# Patient Record
(demographics unavailable — no encounter records)

---

## 2024-12-19 NOTE — REASON FOR VISIT
[Follow-Up] : a follow-up visit [Pulmonary Hypertension] : pulmonary hypertension [[employer] ] : provided by [employer]   [TextBox_13] : Dr. Magalys Bray

## 2024-12-19 NOTE — HISTORY OF PRESENT ILLNESS
[Former] : former [< 20 pack-years] : < 20 pack-years [TextBox_4] : 82 year old woman with Type 2 DM, HTN, COPD (former smoker 19 PY quit 1992), on 3L O2. She ambulates with a walker at home and a wheelchair outside the home. She can't walk very much. Can't quantify. She gets tired quickly. Today walking form elevator to office she got tired. She feels tired and a little bit of chest pain. She does feel dizzy but not syncope. She has palpitations with and without walking. January 2021 she started using O2. She saw a pulmonologist at [facility], sees him every 3 months. She has had PFTs in the past. In January 2021 her Hgb was really low and went for ?endoscopy. She left the hospital with oxygen. Even before 2021 she used walker outside but more due to arthritis. 1/2 PPD for 37 years, quit 1992. March 2021 had bad bronchitis and needed intubation for 4 days. She had multiple hospitalizations in 2021 for respiratory complaints and fluid retention. She has a lot of joint pains in her hands and knees. Takes acetaminophen which helps. No Raynaud's. LE edema is currently controlled. She never worked outside of the home.   She has had persistently elevated RVSP since 2021 (60 -> 90 -> 65 -> 75) and Grade II diastolic dysfunction and moderate pericardial effusion. After initial visit she underwent V/Q scan (low probability for CTEPD), RUQ US (no portal htn, but + cirrhotic liver), HIV/Hep C neg, Cr 1.59, BNP elevated to 1321, CTD panel WNL, mild nonspecific elevation in VICTOR HUGO 1:160, chronic anemia (H/H 10.1/31.1). Sleep study negative for IMTIAZ.   12-20-24 October 2023 RHC severely elevated PA pressures w/ normal wedge pressures. SBP in 180-200's. Required hydralazine, amlodipine during RHC procedure. Admitted to [facility].  Started sildenafil and high dose CCB. PH likely secondary to severe, persistent systemic HTN. ECHO shows normal LV size/function EF 55-60%, dilated RV size, mild to moderate tricuspid regurgitation. Discharged on sildenafil 20 mg PO TID, amlodipine 20 mg PO QD. Increased her doses of amlodipine and hydralazine and changed metoprolol to Coreg    [TextBox_11] : 0.5 [TextBox_13] : 37 [YearQuit] : 1992

## 2024-12-19 NOTE — CONSULT LETTER
[Dear  ___] : Dear  [unfilled], [Consult Letter:] : I had the pleasure of evaluating your patient, [unfilled]. [Please see my note below.] : Please see my note below. [Consult Closing:] : Thank you very much for allowing me to participate in the care of this patient.  If you have any questions, please do not hesitate to contact me. [Sincerely,] : Sincerely, [FreeTextEntry2] : Dr. Magalys Bray [FreeTextEntry3] : Karen Barrett DO \par  Director of Pulmonary Hypertension\par  Department of Pulmonary and Critical Care\par  [facility]

## 2024-12-19 NOTE — ASSESSMENT
[FreeTextEntry1] : 82 year old female with hx of COPD, CKD, HFpEF on 3L NC chronically referred to PH given ECHO showing elevated PASP to 70s.  #Pulmonary HTN -  WHO Group I, II, III; never received PFTs from pulmonologist. V/Q scan negative, Sleep study negative, CTD work up negative. Concerning pertinent positives are + cirrhosis on US (?cardiac cirrhosis?), CKD (Cr 1.59), and elevated NTproBNP 1321. Persistently hypertensive during visits (-165) suggesting poorly controlled htn vs. white coat htn.   Echocardiogram suggestive of group II disease in addition to her underlying RF given grade II DD, LAE, LVH but also had septal flattening in systole which may suggest precapillary component.   Recommend RHC to definitively characterize hemodynamics and further therapeutic options given ongoing GERONIMO and limited ADLs, NYHA FC II-III. Risks and benefits of procedure discussed, patient agreeable. Plan for RHC Oct 5th. Pt to be NPO after midnight,  advised to hold amlodipine morning of procedure, can continue with all other medications.   #Chronic hypoxia #COPD  Still need to obtain outpatient pulmonologist records, specifically PFTs, to assess severity of spirometry. Not using maintenance inhalers.   RTC after above testing completed. Will have post RHC visit to discuss results.

## 2024-12-19 NOTE — PROCEDURE
[FreeTextEntry1] : 10/20/23 ECHO 1. Normal left ventricular size and systolic function.  2. Dilated right ventricular size.  3. Normal right ventricular systolic function.  4. Biatrial enlargement.  5. Mild-to-moderate tricuspid regurgitation.  6. Pulmonary hypertension present, pulmonary artery systolic pressure is 78 mmHg.  7. Trivial pericardial effusion.  8. No prior echo is available for comparison. ***** 10/19/23 RHC RHC  BP - 188-218/79-84 -120; HR 79, SpO2 94% RA  RAP - 8  RV - 112/8  PA - 112/26/60  PAWP - 12  CO/CI - 7.4/3.99  TPG - 48, DPG - 14, PVR - 6.5WU  PVR/SVR - 0.45   SL Nitro   /21/54  PAWP 11   Rachel  /72/104  RAP 6  PA - 86/18/43  PAWP - 11  CO/CI - 7.67/4.14  TPG - 32, DPG - 7, PVR - 4WU, SVR - 1,022 dynes  PVR/SVR - 0.31  Recommendations:   Impression - severe precapillary pulmonary hypertension associated with severe systemic hypertension but normal L sided filling pressures and normal CO/CI. Resistant hypertension is unusual in precapillary PH, but given severity of PH warrants treatment. Borderline vasoreactivity with significant improvement in PA pressures and PVR. Will plan to admit to hospital for initiation of oral vasodilators and high dose CCB *****  ***** 8/17/23 RUQ US Cirrhotic liver. No evidence of portal hypertension ***** 8/17/23 V/Q SCAN Low probability for pulmonary embolism. ***** Sleep Study 7/2023 - no significant sleep disordered breathing **** 6/7/23 HGB 10.1 (L) HCT 31.1 (L) Pro BNP 1321 HIV and Hepatitis C nonreactive LFTS and uric acid WNL Creatinine 1.59 Rheumatoid factor, C3, C4, Scleroderma, Anti RNP, CCP, ds DNA VICTOR HUGO 1:160 homogenous ***** 1/28/23 ECHO EF 50-55% Septal flattening in systole suggesting RV pressure overload. RV moderately dilated. RVSP estimated 75 mmHg Moderate pericardial effusion ***** 3/8/22 ECHO Moderate dilation LA, RA and mild dilation of RV, moderate MR/TR. Grade II diastolic dysfunction, moderate concentric LVH, RVSP 65. Moderate pericardial effusion ***** 12/22/21 ECHO EF 55-60% Grade II diastolic dysfunction. RVSP 90. + pericardial effusion ***** 1/19/21 ECHO LVEF 55-60% RVSP 60 ***** 5/2018 [facility] Normal coronaries